# Patient Record
Sex: FEMALE | Race: BLACK OR AFRICAN AMERICAN | NOT HISPANIC OR LATINO
[De-identification: names, ages, dates, MRNs, and addresses within clinical notes are randomized per-mention and may not be internally consistent; named-entity substitution may affect disease eponyms.]

---
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
## 2023-05-12 ENCOUNTER — RX RENEWAL (OUTPATIENT)
Age: 18
End: 2023-05-12

## 2023-05-23 ENCOUNTER — RX RENEWAL (OUTPATIENT)
Age: 18
End: 2023-05-23

## 2023-05-26 ENCOUNTER — APPOINTMENT (OUTPATIENT)
Dept: PEDIATRIC NEUROLOGY | Facility: CLINIC | Age: 18
End: 2023-05-26
Payer: COMMERCIAL

## 2023-05-26 VITALS
SYSTOLIC BLOOD PRESSURE: 137 MMHG | DIASTOLIC BLOOD PRESSURE: 83 MMHG | WEIGHT: 210 LBS | HEART RATE: 87 BPM | BODY MASS INDEX: 33.75 KG/M2 | HEIGHT: 66.06 IN

## 2023-05-26 DIAGNOSIS — G24.1 GENETIC TORSION DYSTONIA: ICD-10-CM

## 2023-05-26 PROCEDURE — 99214 OFFICE O/P EST MOD 30 MIN: CPT

## 2023-05-26 NOTE — PHYSICAL EXAM

## 2023-05-26 NOTE — ASSESSMENT
[FreeTextEntry1] : Cyndy is a 18 year old girl with history of bilateral leg spasms and dropping to the floor episodes. 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 This may account for dramatic response to CBZ treatment.Her father reported having similar episodes or seizure from which he has outgrown. \par \par Cyndy is tolerating Carbamazepine ER  200 mg BID.  When she misses a dose, she does have an episode. genetic testing for a comprehensive dystonia panel through [facility] and [facility] were uncertain

## 2023-05-26 NOTE — HISTORY OF PRESENT ILLNESS
[None] : The patient is currently asymptomatic [FreeTextEntry1] : 1/4/2017   accompanied by mother. 2 years history of recurrent episodes during which when walking she may fall and if in bed she may toss her head backward. Has been on Keppra 750 twice daily and more recently because of being sedated once daily. The dictation and decreased the number of the episodes but she continued to have them daily. Has otherwise been healthy. Her father had similar episodes in the past all initiated by motion. He had an episode of the grand mal and was on medication for a while now off all medications\par \par A video EEG for 2 days from December 19, 2016 was read as normal. Prior EEGs were normal as well. A brain MRI on 8/26/2016 was normal.\par \par 4/28/2017: with mother. Seizure free on Carbatrol 200mg BID. No other physical complaints. \par \par 12/1/2017: with father. Reportedly seizure free on Carbatrol 200mg BID. Her Rx indicates that we prescribed 2 capsules twice daily. No other physical complaints. \par Spoke to mother by phone. She reported that the child has no seizures. Takes Carbatrol 200mg BID. Sometimes forgets the evening dose. \par \par 12/13/18- with mother; Currently taking Carbamazepine  mg BID. She ran out of medications last week and she had 8 seizures since then. She has leg spasms and she falls down- unsure if it is dystonia or seizures. Whenn episode occurs she is aware of it and remembers the episode. She knows the episode is about to occur 2 seconds before.\par \par 5/26/2023 By herself. No falling,spasm episodes as long  as she takes the Carbamazepine. Otherwise remains healthy. \par \par 8/22/19- with mother; Currently taking Carbamazepine  mg BID. She did have a few episodes of has leg spasms but, without falling down since last visit, when she forgot to take her meds.- When episode occurs she is aware of it and remembers the episode. She knows the episode is about to occur 2 seconds before. Just started birth control 3 months ago for irregular cycles.\par \par 4/8/2021 with her father. 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 Well  controlled with  Carbamazepine ER 200mg BID. No reported side effects. \par \par \par 12/9/2021 with her father. 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 Well  controlled with  Carbamazepine ER 200mg BID. No reported side effects. Symptoms reappeared when took self off medication for few days.

## 2023-05-27 LAB
ALBUMIN SERPL ELPH-MCNC: 4.7 G/DL
ALP BLD-CCNC: 105 U/L
ALT SERPL-CCNC: 11 U/L
ANION GAP SERPL CALC-SCNC: 14 MMOL/L
AST SERPL-CCNC: 14 U/L
BILIRUB SERPL-MCNC: <0.2 MG/DL
BUN SERPL-MCNC: 14 MG/DL
CALCIUM SERPL-MCNC: 9.7 MG/DL
CHLORIDE SERPL-SCNC: 101 MMOL/L
CO2 SERPL-SCNC: 24 MMOL/L
CREAT SERPL-MCNC: 0.87 MG/DL
EGFR: 98 ML/MIN/1.73M2
POTASSIUM SERPL-SCNC: 4.4 MMOL/L
PROT SERPL-MCNC: 7.6 G/DL
SODIUM SERPL-SCNC: 139 MMOL/L

## 2023-06-07 ENCOUNTER — NON-APPOINTMENT (OUTPATIENT)
Age: 18
End: 2023-06-07

## 2024-06-18 ENCOUNTER — NON-APPOINTMENT (OUTPATIENT)
Age: 19
End: 2024-06-18

## 2024-06-19 ENCOUNTER — RX RENEWAL (OUTPATIENT)
Age: 19
End: 2024-06-19

## 2024-06-28 ENCOUNTER — RX RENEWAL (OUTPATIENT)
Age: 19
End: 2024-06-28

## 2024-07-23 ENCOUNTER — LABORATORY RESULT (OUTPATIENT)
Age: 19
End: 2024-07-23

## 2024-07-23 ENCOUNTER — APPOINTMENT (OUTPATIENT)
Dept: PEDIATRIC NEUROLOGY | Facility: CLINIC | Age: 19
End: 2024-07-23
Payer: COMMERCIAL

## 2024-07-23 VITALS — WEIGHT: 204.99 LBS | HEART RATE: 88 BPM | DIASTOLIC BLOOD PRESSURE: 86 MMHG | SYSTOLIC BLOOD PRESSURE: 136 MMHG

## 2024-07-23 DIAGNOSIS — G24.1 GENETIC TORSION DYSTONIA: ICD-10-CM

## 2024-07-23 PROCEDURE — 99214 OFFICE O/P EST MOD 30 MIN: CPT

## 2024-07-23 NOTE — ASSESSMENT
[FreeTextEntry1] : Cyndy is a 19 year old girl with history of bilateral leg spasms and dropping to the floor episodes. 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 This may account for dramatic response to CBZ treatment. Her father reported having similar episodes or seizure from which he has outgrown.  Cyndy is tolerating Carbamazepine ER  200 mg BID.  When she misses a dose, she does have an episode. genetic testing for a comprehensive dystonia panel through [facility] and [facility] were uncertain

## 2024-07-23 NOTE — PHYSICAL EXAM
[Cranial Nerves Optic (II)] : visual acuity intact bilaterally,  visual fields full to confrontation, pupils equal round and reactive to light [Cranial Nerves Oculomotor (III)] : extraocular motion intact [Cranial Nerves Trigeminal (V)] : facial sensation intact symmetrically [Cranial Nerves Facial (VII)] : face symmetrical [Cranial Nerves Vestibulocochlear (VIII)] : hearing was intact bilaterally [Cranial Nerves Glossopharyngeal (IX)] : tongue and palate midline [Cranial Nerves Accessory (XI - Cranial And Spinal)] : head turning and shoulder shrug symmetric [Cranial Nerves Hypoglossal (XII)] : there was no tongue deviation with protrusion [Tandem Walking] : normal tandem walking [Normal] : patient has a normal gait including toe-walking, heel-walking and tandem walking. Romberg sign is negative. [de-identified] : Normal fundic exam

## 2024-07-23 NOTE — HISTORY OF PRESENT ILLNESS
[None] : The patient is currently asymptomatic [FreeTextEntry1] : 1/4/2017   accompanied by mother. 2 years history of recurrent episodes during which when walking she may fall and if in bed she may toss her head backward. Has been on Keppra 750 twice daily and more recently because of being sedated once daily. The dictation and decreased the number of the episodes but she continued to have them daily. Has otherwise been healthy. Her father had similar episodes in the past all initiated by motion. He had an episode of the grand mal and was on medication for a while now off all medications  A video EEG for 2 days from December 19, 2016 was read as normal. Prior EEGs were normal as well. A brain MRI on 8/26/2016 was normal.  4/28/2017: with mother. Seizure free on Carbatrol 200mg BID. No other physical complaints.   12/1/2017: with father. Reportedly seizure free on Carbatrol 200mg BID. Her Rx indicates that we prescribed 2 capsules twice daily. No other physical complaints.  Spoke to mother by phone. She reported that the child has no seizures. Takes Carbatrol 200mg BID. Sometimes forgets the evening dose.   12/13/18- with mother; Currently taking Carbamazepine  mg BID. She ran out of medications last week and she had 8 seizures since then. She has leg spasms and she falls down- unsure if it is dystonia or seizures. Whenn episode occurs she is aware of it and remembers the episode. She knows the episode is about to occur 2 seconds before.  5/26/2023 By herself. No falling,spasm episodes as long  as she takes the Carbamazepine. Otherwise remains healthy.   8/22/19- with mother; Currently taking Carbamazepine  mg BID. She did have a few episodes of has leg spasms but, without falling down since last visit, when she forgot to take her meds.- When episode occurs she is aware of it and remembers the episode. She knows the episode is about to occur 2 seconds before. Just started birth control 3 months ago for irregular cycles.  4/8/2021 with her father. 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 Well  controlled with  Carbamazepine ER 200mg BID. No reported side effects.    12/9/2021 with her father. 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 Well  controlled with  Carbamazepine ER 200mg BID. No reported side effects. Symptoms reappeared when took self off medication for few days.   7/23/2024 by herself. 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 Well  controlled with  Carbamazepine ER 200mg BID. No reported side effects. Symptoms reappeared when took self off medication for few days.

## 2024-07-24 LAB
ALBUMIN SERPL ELPH-MCNC: 4.4 G/DL
ALP BLD-CCNC: 121 U/L
ALT SERPL-CCNC: 16 U/L
ANION GAP SERPL CALC-SCNC: 12 MMOL/L
AST SERPL-CCNC: 13 U/L
BASOPHILS # BLD AUTO: 0.04 K/UL
BASOPHILS NFR BLD AUTO: 0.3 %
BILIRUB SERPL-MCNC: <0.2 MG/DL
BUN SERPL-MCNC: 7 MG/DL
CALCIUM SERPL-MCNC: 9.3 MG/DL
CHLORIDE SERPL-SCNC: 100 MMOL/L
CO2 SERPL-SCNC: 26 MMOL/L
CREAT SERPL-MCNC: 0.63 MG/DL
EGFR: 129 ML/MIN/1.73M2
EOSINOPHIL # BLD AUTO: 0.07 K/UL
EOSINOPHIL NFR BLD AUTO: 0.6 %
HCT VFR BLD CALC: 43.1 %
HGB BLD-MCNC: 11.8 G/DL
IMM GRANULOCYTES NFR BLD AUTO: 0.5 %
LYMPHOCYTES # BLD AUTO: 2.53 K/UL
LYMPHOCYTES NFR BLD AUTO: 20.2 %
MAN DIFF?: NORMAL
MCHC RBC-ENTMCNC: 20.4 PG
MCHC RBC-ENTMCNC: 27.4 GM/DL
MCV RBC AUTO: 74.6 FL
MONOCYTES # BLD AUTO: 0.61 K/UL
MONOCYTES NFR BLD AUTO: 4.9 %
NEUTROPHILS # BLD AUTO: 9.21 K/UL
NEUTROPHILS NFR BLD AUTO: 73.5 %
PLATELET # BLD AUTO: 331 K/UL
POTASSIUM SERPL-SCNC: 4.8 MMOL/L
PROT SERPL-MCNC: 7 G/DL
RBC # BLD: 5.78 M/UL
RBC # FLD: 17.2 %
SODIUM SERPL-SCNC: 138 MMOL/L
WBC # FLD AUTO: 12.52 K/UL

## 2025-07-25 ENCOUNTER — RX RENEWAL (OUTPATIENT)
Age: 20
End: 2025-07-25

## 2025-07-30 ENCOUNTER — RX RENEWAL (OUTPATIENT)
Age: 20
End: 2025-07-30

## 2025-08-07 ENCOUNTER — NON-APPOINTMENT (OUTPATIENT)
Age: 20
End: 2025-08-07